# Patient Record
Sex: FEMALE | Race: ASIAN | NOT HISPANIC OR LATINO
[De-identification: names, ages, dates, MRNs, and addresses within clinical notes are randomized per-mention and may not be internally consistent; named-entity substitution may affect disease eponyms.]

---

## 2017-01-11 ENCOUNTER — APPOINTMENT (OUTPATIENT)
Dept: SURGERY | Facility: CLINIC | Age: 52
End: 2017-01-11

## 2017-01-11 VITALS
SYSTOLIC BLOOD PRESSURE: 126 MMHG | HEIGHT: 60 IN | WEIGHT: 100 LBS | HEART RATE: 71 BPM | BODY MASS INDEX: 19.63 KG/M2 | TEMPERATURE: 97.6 F | DIASTOLIC BLOOD PRESSURE: 82 MMHG

## 2017-01-27 ENCOUNTER — OUTPATIENT (OUTPATIENT)
Dept: OUTPATIENT SERVICES | Facility: HOSPITAL | Age: 52
LOS: 1 days | End: 2017-01-27
Payer: COMMERCIAL

## 2017-01-27 VITALS
RESPIRATION RATE: 14 BRPM | HEIGHT: 60 IN | HEART RATE: 84 BPM | WEIGHT: 98.99 LBS | DIASTOLIC BLOOD PRESSURE: 70 MMHG | SYSTOLIC BLOOD PRESSURE: 104 MMHG | TEMPERATURE: 99 F

## 2017-01-27 DIAGNOSIS — K64.9 UNSPECIFIED HEMORRHOIDS: ICD-10-CM

## 2017-01-27 LAB
BUN SERPL-MCNC: 8 MG/DL — SIGNIFICANT CHANGE UP (ref 7–23)
CALCIUM SERPL-MCNC: 9.2 MG/DL — SIGNIFICANT CHANGE UP (ref 8.4–10.5)
CHLORIDE SERPL-SCNC: 100 MMOL/L — SIGNIFICANT CHANGE UP (ref 98–107)
CO2 SERPL-SCNC: 25 MMOL/L — SIGNIFICANT CHANGE UP (ref 22–31)
CREAT SERPL-MCNC: 0.66 MG/DL — SIGNIFICANT CHANGE UP (ref 0.5–1.3)
GLUCOSE SERPL-MCNC: 101 MG/DL — HIGH (ref 70–99)
HCT VFR BLD CALC: 37.4 % — SIGNIFICANT CHANGE UP (ref 34.5–45)
HGB BLD-MCNC: 13 G/DL — SIGNIFICANT CHANGE UP (ref 11.5–15.5)
MCHC RBC-ENTMCNC: 31.6 PG — SIGNIFICANT CHANGE UP (ref 27–34)
MCHC RBC-ENTMCNC: 34.8 % — SIGNIFICANT CHANGE UP (ref 32–36)
MCV RBC AUTO: 91 FL — SIGNIFICANT CHANGE UP (ref 80–100)
PLATELET # BLD AUTO: 184 K/UL — SIGNIFICANT CHANGE UP (ref 150–400)
PMV BLD: 10.6 FL — SIGNIFICANT CHANGE UP (ref 7–13)
POTASSIUM SERPL-MCNC: 3.7 MMOL/L — SIGNIFICANT CHANGE UP (ref 3.5–5.3)
POTASSIUM SERPL-SCNC: 3.7 MMOL/L — SIGNIFICANT CHANGE UP (ref 3.5–5.3)
RBC # BLD: 4.11 M/UL — SIGNIFICANT CHANGE UP (ref 3.8–5.2)
RBC # FLD: 12 % — SIGNIFICANT CHANGE UP (ref 10.3–14.5)
SODIUM SERPL-SCNC: 140 MMOL/L — SIGNIFICANT CHANGE UP (ref 135–145)
WBC # BLD: 6.56 K/UL — SIGNIFICANT CHANGE UP (ref 3.8–10.5)
WBC # FLD AUTO: 6.56 K/UL — SIGNIFICANT CHANGE UP (ref 3.8–10.5)

## 2017-01-27 PROCEDURE — 93010 ELECTROCARDIOGRAM REPORT: CPT

## 2017-01-27 NOTE — H&P PST ADULT - REASON FOR ADMISSION
"it's been bugging me the past 10 years, really hurting, bleeding...I have problem with the hemorrhoids...they did rubber banding and the second time she hurt me so much I didn't even go back"

## 2017-01-27 NOTE — H&P PST ADULT - PROBLEM SELECTOR PLAN 1
Pt. is scheduled for a hemorrhoidectomy 2/10/17. Pt. is scheduled for a hemorrhoidectomy 2/10/17.  Urine specimen cup given for collection day of surgery.

## 2017-01-27 NOTE — H&P PST ADULT - MALLAMPATI CLASS
Class I (easy) - visualization of the soft palate, fauces, uvula, and both anterior and posterior pillars/on phonation

## 2017-01-27 NOTE — H&P PST ADULT - ASSESSMENT
Pt. is a 52 yo female with hemorrhoids. Pt. is a 52 yo female with painful and bleeding hemorrhoids.

## 2017-02-09 ENCOUNTER — RESULT REVIEW (OUTPATIENT)
Age: 52
End: 2017-02-09

## 2017-02-10 ENCOUNTER — APPOINTMENT (OUTPATIENT)
Dept: SURGERY | Facility: AMBULATORY SURGERY CENTER | Age: 52
End: 2017-02-10

## 2017-02-10 ENCOUNTER — TRANSCRIPTION ENCOUNTER (OUTPATIENT)
Age: 52
End: 2017-02-10

## 2017-02-10 ENCOUNTER — OUTPATIENT (OUTPATIENT)
Dept: OUTPATIENT SERVICES | Facility: HOSPITAL | Age: 52
LOS: 1 days | Discharge: HOME | End: 2017-02-10
Payer: COMMERCIAL

## 2017-02-10 VITALS
RESPIRATION RATE: 12 BRPM | SYSTOLIC BLOOD PRESSURE: 110 MMHG | TEMPERATURE: 98 F | OXYGEN SATURATION: 98 % | DIASTOLIC BLOOD PRESSURE: 70 MMHG | HEART RATE: 70 BPM

## 2017-02-10 VITALS
DIASTOLIC BLOOD PRESSURE: 71 MMHG | TEMPERATURE: 98 F | WEIGHT: 98.99 LBS | HEIGHT: 60 IN | RESPIRATION RATE: 18 BRPM | HEART RATE: 65 BPM | OXYGEN SATURATION: 100 % | SYSTOLIC BLOOD PRESSURE: 125 MMHG

## 2017-02-10 DIAGNOSIS — K64.9 UNSPECIFIED HEMORRHOIDS: ICD-10-CM

## 2017-02-10 PROCEDURE — 88304 TISSUE EXAM BY PATHOLOGIST: CPT | Mod: 26

## 2017-02-10 PROCEDURE — 46260 REMOVE IN/EX HEM GROUPS 2+: CPT

## 2017-02-10 RX ORDER — KETOROLAC TROMETHAMINE 30 MG/ML
1 SYRINGE (ML) INJECTION
Qty: 12 | Refills: 0 | OUTPATIENT
Start: 2017-02-10 | End: 2017-02-13

## 2017-02-10 NOTE — ASU DISCHARGE PLAN (ADULT/PEDIATRIC). - NOTIFY
Persistent Nausea and Vomiting/Swelling that continues/Fever greater than 101/Bleeding that does not stop/Unable to Urinate Fever greater than 101/Pain not relieved by Medications/Bleeding that does not stop/Swelling that continues/Unable to Urinate/Inability to Tolerate Liquids or Foods/Persistent Nausea and Vomiting

## 2017-02-10 NOTE — ASU DISCHARGE PLAN (ADULT/PEDIATRIC). - NURSING INSTRUCTIONS
Take stool softener  If bowel movement takes place before 24 hrs then remove dressing   Bleeding expected for up to a week post op   after each bowel movement cleanse area with water   Can sit on "donut" for comfort after surgery   Soak in hot plain water 3x a day

## 2017-02-10 NOTE — ASU DISCHARGE PLAN (ADULT/PEDIATRIC). - ACTIVITY LEVEL
nothing per rectum no heavy lifting/no sports/gym/no swimming, no hot tub/nothing per rectum/no exercise

## 2017-02-14 LAB — SURGICAL PATHOLOGY STUDY: SIGNIFICANT CHANGE UP

## 2017-02-16 PROBLEM — I49.3 VENTRICULAR PREMATURE DEPOLARIZATION: Chronic | Status: ACTIVE | Noted: 2017-01-27

## 2017-02-16 PROBLEM — K64.9 UNSPECIFIED HEMORRHOIDS: Chronic | Status: ACTIVE | Noted: 2017-01-27

## 2017-02-22 ENCOUNTER — APPOINTMENT (OUTPATIENT)
Dept: SURGERY | Facility: CLINIC | Age: 52
End: 2017-02-22

## 2017-02-22 VITALS
WEIGHT: 100 LBS | HEIGHT: 60 IN | DIASTOLIC BLOOD PRESSURE: 76 MMHG | BODY MASS INDEX: 19.63 KG/M2 | TEMPERATURE: 97.6 F | SYSTOLIC BLOOD PRESSURE: 115 MMHG | HEART RATE: 71 BPM

## 2017-03-08 ENCOUNTER — APPOINTMENT (OUTPATIENT)
Dept: SURGERY | Facility: CLINIC | Age: 52
End: 2017-03-08

## 2017-03-08 VITALS
HEIGHT: 60 IN | WEIGHT: 100 LBS | DIASTOLIC BLOOD PRESSURE: 78 MMHG | BODY MASS INDEX: 19.63 KG/M2 | SYSTOLIC BLOOD PRESSURE: 106 MMHG | HEART RATE: 84 BPM | TEMPERATURE: 98 F

## 2017-03-22 ENCOUNTER — APPOINTMENT (OUTPATIENT)
Dept: SURGERY | Facility: CLINIC | Age: 52
End: 2017-03-22

## 2017-03-22 VITALS
SYSTOLIC BLOOD PRESSURE: 113 MMHG | WEIGHT: 100 LBS | HEIGHT: 60 IN | DIASTOLIC BLOOD PRESSURE: 77 MMHG | HEART RATE: 71 BPM | TEMPERATURE: 98 F | BODY MASS INDEX: 19.63 KG/M2

## 2017-03-22 DIAGNOSIS — Z09 ENCOUNTER FOR FOLLOW-UP EXAMINATION AFTER COMPLETED TREATMENT FOR CONDITIONS OTHER THAN MALIGNANT NEOPLASM: ICD-10-CM

## 2017-04-12 ENCOUNTER — APPOINTMENT (OUTPATIENT)
Dept: SURGERY | Facility: CLINIC | Age: 52
End: 2017-04-12

## 2017-04-12 VITALS
HEIGHT: 60 IN | HEART RATE: 71 BPM | SYSTOLIC BLOOD PRESSURE: 122 MMHG | BODY MASS INDEX: 19.63 KG/M2 | DIASTOLIC BLOOD PRESSURE: 77 MMHG | WEIGHT: 100 LBS

## 2017-04-12 DIAGNOSIS — Z09 ENCOUNTER FOR FOLLOW-UP EXAMINATION AFTER COMPLETED TREATMENT FOR CONDITIONS OTHER THAN MALIGNANT NEOPLASM: ICD-10-CM

## 2021-07-14 NOTE — H&P PST ADULT - VENOUS THROMBOEMBOLISM AGE
Detail Level: Detailed Quality 130: Documentation Of Current Medications In The Medical Record: Current Medications with Name, Dosage, Frequency, or Route not Documented, Reason not Given Quality 226: Preventive Care And Screening: Tobacco Use: Screening And Cessation Intervention: Patient screened for tobacco use and is an ex/non-smoker 40-59 yrs

## 2022-08-18 ENCOUNTER — APPOINTMENT (OUTPATIENT)
Dept: COLORECTAL SURGERY | Facility: CLINIC | Age: 57
End: 2022-08-18

## 2022-08-18 VITALS
SYSTOLIC BLOOD PRESSURE: 119 MMHG | OXYGEN SATURATION: 98 % | WEIGHT: 107 LBS | BODY MASS INDEX: 21.01 KG/M2 | HEIGHT: 60 IN | DIASTOLIC BLOOD PRESSURE: 80 MMHG | RESPIRATION RATE: 16 BRPM | HEART RATE: 71 BPM | TEMPERATURE: 98.1 F

## 2022-08-18 DIAGNOSIS — R10.9 UNSPECIFIED ABDOMINAL PAIN: ICD-10-CM

## 2022-08-18 DIAGNOSIS — E03.9 HYPOTHYROIDISM, UNSPECIFIED: ICD-10-CM

## 2022-08-18 DIAGNOSIS — Z01.818 ENCOUNTER FOR OTHER PREPROCEDURAL EXAMINATION: ICD-10-CM

## 2022-08-18 DIAGNOSIS — K60.2 ANAL FISSURE, UNSPECIFIED: ICD-10-CM

## 2022-08-18 DIAGNOSIS — Z78.9 OTHER SPECIFIED HEALTH STATUS: ICD-10-CM

## 2022-08-18 DIAGNOSIS — Z12.11 ENCOUNTER FOR SCREENING FOR MALIGNANT NEOPLASM OF COLON: ICD-10-CM

## 2022-08-18 PROCEDURE — 99202 OFFICE O/P NEW SF 15 MIN: CPT

## 2022-08-18 RX ORDER — OXYCODONE AND ACETAMINOPHEN 5; 325 MG/1; MG/1
5-325 TABLET ORAL
Qty: 20 | Refills: 0 | Status: DISCONTINUED | COMMUNITY
Start: 2017-02-22 | End: 2022-08-18

## 2022-08-18 RX ORDER — OXYCODONE HYDROCHLORIDE AND ACETAMINOPHEN 10; 325 MG/1; MG/1
TABLET ORAL
Refills: 0 | Status: DISCONTINUED | COMMUNITY
End: 2022-08-18

## 2022-08-18 NOTE — PHYSICAL EXAM
[Normal Breath Sounds] : Normal breath sounds [Normal Heart Sounds] : normal heart sounds [Normal Rate and Rhythm] : normal rate and rhythm [Alert] : alert [Oriented to Person] : oriented to person [Oriented to Place] : oriented to place [Oriented to Time] : oriented to time [Calm] : calm [de-identified] : flat soft +BS NT/ND [de-identified] : well nourished female [de-identified] : NC/AT [de-identified] : +ROM [de-identified] : intact

## 2022-08-18 NOTE — ASSESSMENT
[FreeTextEntry1] : Intermittent abdominal pain, family history of colon cancer\par -Patient is scheduled for screening colonoscopy\par -Will evaluating for Colonoscopic cause of abdominal pain\par -All questions answered\par -Risks and benefits reviewed\par -Bowel prep given

## 2022-08-18 NOTE — HISTORY OF PRESENT ILLNESS
[FreeTextEntry1] : 58yo F pt presents for NCA, colonoscopy scheduled for 22.\par Screening colonoscopy with +fmhx of colon cancer for maternal uncle and mother in her 70's; rectal cancer for sister before age 50.\par \par Denies rectal bleeding, anal pain,  nausea, vomiting, chest pain, SOB, heart issues, lung issues, or kidney issues.  The patient does report abdominal discomfort with eating. She has previously been evaluated for celiac disease which was found to be negative. She reports self resolution of abdominal pain after eating\par \par Hx of hemorrhoidectomy, , and colonoscopy with Dr. Vargas in 2015.

## 2022-08-28 ENCOUNTER — NON-APPOINTMENT (OUTPATIENT)
Age: 57
End: 2022-08-28

## 2022-09-01 ENCOUNTER — APPOINTMENT (OUTPATIENT)
Dept: COLORECTAL SURGERY | Facility: CLINIC | Age: 57
End: 2022-09-01

## 2022-09-01 DIAGNOSIS — K63.5 POLYP OF COLON: ICD-10-CM

## 2022-09-01 DIAGNOSIS — K64.9 UNSPECIFIED HEMORRHOIDS: ICD-10-CM

## 2022-09-01 PROCEDURE — 45380 COLONOSCOPY AND BIOPSY: CPT

## 2022-09-04 ENCOUNTER — TRANSCRIPTION ENCOUNTER (OUTPATIENT)
Age: 57
End: 2022-09-04

## 2022-09-08 LAB — CORE LAB BIOPSY: NORMAL

## 2023-03-07 NOTE — H&P PST ADULT - ABILITY TO HEAR (WITH HEARING AID OR HEARING APPLIANCE IF NORMALLY USED):
Conjuntivae and eyelids appear normal, Sclerae : White without injection
Adequate: hears normal conversation without difficulty

## 2024-04-18 NOTE — ASU PREOPERATIVE ASSESSMENT, ADULT ([facility] ONLY) - PAIN: PRESENCE, MLM
is normal.      Breath sounds: Normal breath sounds. No wheezing, rhonchi or rales.   Abdominal:      General: Bowel sounds are normal.      Palpations: Abdomen is soft.   Musculoskeletal:      Cervical back: Normal range of motion.        Feet:    Skin:     General: Skin is dry.   Neurological:      General: No focal deficit present.      Mental Status: She is alert and oriented to person, place, and time. Mental status is at baseline.   Psychiatric:         Mood and Affect: Mood normal.         Behavior: Behavior normal.         Thought Content: Thought content normal.         Judgment: Judgment normal.           An electronic signature was used to authenticate this note.    --YSABEL Blankenship    denies pain/discomfort

## 2025-05-28 NOTE — H&P PST ADULT - ANESTHESIA, PREVIOUS REACTION, PROFILE
[FreeTextEntry1] : 11 y/o female here today with contusion of left knee medial femoral condyle after [place] 2 [place] ago. Patient was last seen by JEAN CLAUDE Long and was advised to obtain a knee immobilizer. "after anesthesia I get light headed but after that I'm ok", denies family hx